# Patient Record
Sex: FEMALE | Race: WHITE | NOT HISPANIC OR LATINO | Employment: OTHER | URBAN - METROPOLITAN AREA
[De-identification: names, ages, dates, MRNs, and addresses within clinical notes are randomized per-mention and may not be internally consistent; named-entity substitution may affect disease eponyms.]

---

## 2018-03-22 ENCOUNTER — APPOINTMENT (OUTPATIENT)
Dept: RADIOLOGY | Facility: MEDICAL CENTER | Age: 83
DRG: 087 | End: 2018-03-22
Attending: HOSPITALIST
Payer: MEDICARE

## 2018-03-22 ENCOUNTER — APPOINTMENT (OUTPATIENT)
Dept: RADIOLOGY | Facility: MEDICAL CENTER | Age: 83
DRG: 087 | End: 2018-03-22
Attending: EMERGENCY MEDICINE
Payer: MEDICARE

## 2018-03-22 ENCOUNTER — HOSPITAL ENCOUNTER (OUTPATIENT)
Dept: RADIOLOGY | Facility: MEDICAL CENTER | Age: 83
End: 2018-03-22

## 2018-03-22 ENCOUNTER — RESOLUTE PROFESSIONAL BILLING HOSPITAL PROF FEE (OUTPATIENT)
Dept: HOSPITALIST | Facility: MEDICAL CENTER | Age: 83
End: 2018-03-22
Payer: MEDICARE

## 2018-03-22 ENCOUNTER — HOSPITAL ENCOUNTER (INPATIENT)
Facility: MEDICAL CENTER | Age: 83
LOS: 1 days | DRG: 087 | End: 2018-03-23
Attending: HOSPITALIST | Admitting: HOSPITALIST
Payer: MEDICARE

## 2018-03-22 DIAGNOSIS — I60.9 SUBARACHNOID HEMORRHAGE (HCC): ICD-10-CM

## 2018-03-22 DIAGNOSIS — S00.83XA CONTUSION OF FACE, INITIAL ENCOUNTER: ICD-10-CM

## 2018-03-22 DIAGNOSIS — I10 HYPERTENSION, UNSPECIFIED TYPE: ICD-10-CM

## 2018-03-22 PROBLEM — Z86.39 HISTORY OF HYPOTHYROIDISM: Status: ACTIVE | Noted: 2018-03-22

## 2018-03-22 LAB
ABO GROUP BLD: NORMAL
ABO GROUP BLD: NORMAL
ALBUMIN SERPL BCP-MCNC: 4.3 G/DL (ref 3.2–4.9)
ALBUMIN/GLOB SERPL: 1.5 G/DL
ALP SERPL-CCNC: 129 U/L (ref 30–99)
ALT SERPL-CCNC: 21 U/L (ref 2–50)
ANION GAP SERPL CALC-SCNC: 8 MMOL/L (ref 0–11.9)
APTT PPP: 26.3 SEC (ref 24.7–36)
AST SERPL-CCNC: 18 U/L (ref 12–45)
BILIRUB SERPL-MCNC: 0.6 MG/DL (ref 0.1–1.5)
BLD GP AB SCN SERPL QL: NORMAL
BUN SERPL-MCNC: 13 MG/DL (ref 8–22)
CALCIUM SERPL-MCNC: 9.8 MG/DL (ref 8.5–10.5)
CFT BLD TEG: 4.2 MIN (ref 5–10)
CHLORIDE SERPL-SCNC: 108 MMOL/L (ref 96–112)
CLOT ANGLE BLD TEG: 70.3 DEGREES (ref 53–72)
CLOT LYSIS 30M P MA LENFR BLD TEG: 0.2 % (ref 0–8)
CO2 SERPL-SCNC: 25 MMOL/L (ref 20–33)
CREAT SERPL-MCNC: 0.71 MG/DL (ref 0.5–1.4)
CT.EXTRINSIC BLD ROTEM: 1.4 MIN (ref 1–3)
ERYTHROCYTE [DISTWIDTH] IN BLOOD BY AUTOMATED COUNT: 45.1 FL (ref 35.9–50)
ETHANOL BLD-MCNC: 0 G/DL
GLOBULIN SER CALC-MCNC: 2.8 G/DL (ref 1.9–3.5)
GLUCOSE SERPL-MCNC: 106 MG/DL (ref 65–99)
HCG SERPL QL: NEGATIVE
HCT VFR BLD AUTO: 44.8 % (ref 37–47)
HGB BLD-MCNC: 14.9 G/DL (ref 12–16)
INR PPP: 0.96 (ref 0.87–1.13)
MCF BLD TEG: 66.7 MM (ref 50–70)
MCH RBC QN AUTO: 30.9 PG (ref 27–33)
MCHC RBC AUTO-ENTMCNC: 33.3 G/DL (ref 33.6–35)
MCV RBC AUTO: 92.9 FL (ref 81.4–97.8)
PA AA BLD-ACNC: 50.4 %
PA ADP BLD-ACNC: 26.9 %
PLATELET # BLD AUTO: 241 K/UL (ref 164–446)
PMV BLD AUTO: 9.7 FL (ref 9–12.9)
POTASSIUM SERPL-SCNC: 3.9 MMOL/L (ref 3.6–5.5)
PROT SERPL-MCNC: 7.1 G/DL (ref 6–8.2)
PROTHROMBIN TIME: 12.5 SEC (ref 12–14.6)
RBC # BLD AUTO: 4.82 M/UL (ref 4.2–5.4)
RH BLD: NORMAL
RH BLD: NORMAL
SODIUM SERPL-SCNC: 141 MMOL/L (ref 135–145)
TEG ALGORITHM TGALG: ABNORMAL
WBC # BLD AUTO: 8.9 K/UL (ref 4.8–10.8)

## 2018-03-22 PROCEDURE — 700102 HCHG RX REV CODE 250 W/ 637 OVERRIDE(OP): Performed by: HOSPITALIST

## 2018-03-22 PROCEDURE — 84703 CHORIONIC GONADOTROPIN ASSAY: CPT

## 2018-03-22 PROCEDURE — A9270 NON-COVERED ITEM OR SERVICE: HCPCS | Performed by: HOSPITALIST

## 2018-03-22 PROCEDURE — 85384 FIBRINOGEN ACTIVITY: CPT

## 2018-03-22 PROCEDURE — 85730 THROMBOPLASTIN TIME PARTIAL: CPT

## 2018-03-22 PROCEDURE — 99291 CRITICAL CARE FIRST HOUR: CPT

## 2018-03-22 PROCEDURE — 86901 BLOOD TYPING SEROLOGIC RH(D): CPT

## 2018-03-22 PROCEDURE — 85347 COAGULATION TIME ACTIVATED: CPT

## 2018-03-22 PROCEDURE — 85027 COMPLETE CBC AUTOMATED: CPT

## 2018-03-22 PROCEDURE — 85576 BLOOD PLATELET AGGREGATION: CPT | Mod: 91

## 2018-03-22 PROCEDURE — 80307 DRUG TEST PRSMV CHEM ANLYZR: CPT

## 2018-03-22 PROCEDURE — 93005 ELECTROCARDIOGRAM TRACING: CPT | Performed by: EMERGENCY MEDICINE

## 2018-03-22 PROCEDURE — 86850 RBC ANTIBODY SCREEN: CPT

## 2018-03-22 PROCEDURE — 770022 HCHG ROOM/CARE - ICU (200)

## 2018-03-22 PROCEDURE — 80053 COMPREHEN METABOLIC PANEL: CPT

## 2018-03-22 PROCEDURE — 86900 BLOOD TYPING SEROLOGIC ABO: CPT

## 2018-03-22 PROCEDURE — A9270 NON-COVERED ITEM OR SERVICE: HCPCS | Performed by: SURGERY

## 2018-03-22 PROCEDURE — 700102 HCHG RX REV CODE 250 W/ 637 OVERRIDE(OP): Performed by: SURGERY

## 2018-03-22 PROCEDURE — 70450 CT HEAD/BRAIN W/O DYE: CPT

## 2018-03-22 PROCEDURE — G0390 TRAUMA RESPONS W/HOSP CRITI: HCPCS

## 2018-03-22 PROCEDURE — 700111 HCHG RX REV CODE 636 W/ 250 OVERRIDE (IP): Performed by: HOSPITALIST

## 2018-03-22 PROCEDURE — 99223 1ST HOSP IP/OBS HIGH 75: CPT | Mod: AI | Performed by: HOSPITALIST

## 2018-03-22 PROCEDURE — 71045 X-RAY EXAM CHEST 1 VIEW: CPT

## 2018-03-22 PROCEDURE — 700101 HCHG RX REV CODE 250: Performed by: HOSPITALIST

## 2018-03-22 PROCEDURE — 85610 PROTHROMBIN TIME: CPT

## 2018-03-22 RX ORDER — M-VIT,TX,IRON,MINS/CALC/FOLIC 27MG-0.4MG
1 TABLET ORAL DAILY
COMMUNITY

## 2018-03-22 RX ORDER — SODIUM CHLORIDE 9 MG/ML
INJECTION, SOLUTION INTRAVENOUS
Status: DISCONTINUED | OUTPATIENT
Start: 2018-03-22 | End: 2018-03-22

## 2018-03-22 RX ORDER — OMEPRAZOLE 20 MG/1
20 CAPSULE, DELAYED RELEASE ORAL DAILY
COMMUNITY

## 2018-03-22 RX ORDER — ACETAMINOPHEN 325 MG/1
650 TABLET ORAL EVERY 4 HOURS PRN
Status: DISCONTINUED | OUTPATIENT
Start: 2018-03-22 | End: 2018-03-23 | Stop reason: HOSPADM

## 2018-03-22 RX ORDER — ONDANSETRON 4 MG/1
4 TABLET, ORALLY DISINTEGRATING ORAL EVERY 4 HOURS PRN
Status: DISCONTINUED | OUTPATIENT
Start: 2018-03-22 | End: 2018-03-23 | Stop reason: HOSPADM

## 2018-03-22 RX ORDER — SIMVASTATIN 10 MG
10 TABLET ORAL NIGHTLY
COMMUNITY

## 2018-03-22 RX ORDER — AMOXICILLIN 250 MG
2 CAPSULE ORAL 2 TIMES DAILY
Status: DISCONTINUED | OUTPATIENT
Start: 2018-03-22 | End: 2018-03-23 | Stop reason: HOSPADM

## 2018-03-22 RX ORDER — LEVOTHYROXINE SODIUM 0.03 MG/1
25 TABLET ORAL
COMMUNITY

## 2018-03-22 RX ORDER — HYDRALAZINE HYDROCHLORIDE 20 MG/ML
20 INJECTION INTRAMUSCULAR; INTRAVENOUS EVERY 6 HOURS PRN
Status: DISCONTINUED | OUTPATIENT
Start: 2018-03-22 | End: 2018-03-23 | Stop reason: HOSPADM

## 2018-03-22 RX ORDER — ASPIRIN 81 MG/1
81 TABLET, CHEWABLE ORAL DAILY
Status: ON HOLD | COMMUNITY
End: 2018-03-23

## 2018-03-22 RX ORDER — LABETALOL HYDROCHLORIDE 5 MG/ML
10 INJECTION, SOLUTION INTRAVENOUS
Status: DISCONTINUED | OUTPATIENT
Start: 2018-03-22 | End: 2018-03-22

## 2018-03-22 RX ORDER — LABETALOL HYDROCHLORIDE 5 MG/ML
10 INJECTION, SOLUTION INTRAVENOUS EVERY 4 HOURS PRN
Status: DISCONTINUED | OUTPATIENT
Start: 2018-03-22 | End: 2018-03-23 | Stop reason: HOSPADM

## 2018-03-22 RX ORDER — POLYETHYLENE GLYCOL 3350 17 G/17G
1 POWDER, FOR SOLUTION ORAL
Status: DISCONTINUED | OUTPATIENT
Start: 2018-03-22 | End: 2018-03-23 | Stop reason: HOSPADM

## 2018-03-22 RX ORDER — HYDRALAZINE HYDROCHLORIDE 20 MG/ML
20 INJECTION INTRAMUSCULAR; INTRAVENOUS EVERY 4 HOURS PRN
Status: DISCONTINUED | OUTPATIENT
Start: 2018-03-22 | End: 2018-03-22

## 2018-03-22 RX ORDER — ONDANSETRON 2 MG/ML
4 INJECTION INTRAMUSCULAR; INTRAVENOUS EVERY 4 HOURS PRN
Status: DISCONTINUED | OUTPATIENT
Start: 2018-03-22 | End: 2018-03-23 | Stop reason: HOSPADM

## 2018-03-22 RX ORDER — ACETAMINOPHEN 325 MG/1
650 TABLET ORAL EVERY 6 HOURS PRN
Status: DISCONTINUED | OUTPATIENT
Start: 2018-03-22 | End: 2018-03-22

## 2018-03-22 RX ORDER — BISACODYL 10 MG
10 SUPPOSITORY, RECTAL RECTAL
Status: DISCONTINUED | OUTPATIENT
Start: 2018-03-22 | End: 2018-03-23 | Stop reason: HOSPADM

## 2018-03-22 RX ORDER — OMEPRAZOLE 20 MG/1
20 CAPSULE, DELAYED RELEASE ORAL DAILY
Status: DISCONTINUED | OUTPATIENT
Start: 2018-03-22 | End: 2018-03-23 | Stop reason: HOSPADM

## 2018-03-22 RX ORDER — LEVETIRACETAM 500 MG/1
500 TABLET ORAL 2 TIMES DAILY
Status: DISCONTINUED | OUTPATIENT
Start: 2018-03-22 | End: 2018-03-23 | Stop reason: HOSPADM

## 2018-03-22 RX ADMIN — ACETAMINOPHEN 650 MG: 325 TABLET, FILM COATED ORAL at 18:58

## 2018-03-22 RX ADMIN — OMEPRAZOLE 20 MG: 20 CAPSULE, DELAYED RELEASE ORAL at 15:13

## 2018-03-22 RX ADMIN — HYDRALAZINE HYDROCHLORIDE 20 MG: 20 INJECTION INTRAMUSCULAR; INTRAVENOUS at 16:10

## 2018-03-22 RX ADMIN — LABETALOL HYDROCHLORIDE 10 MG: 5 INJECTION, SOLUTION INTRAVENOUS at 15:09

## 2018-03-22 RX ADMIN — LEVETIRACETAM 500 MG: 500 TABLET, FILM COATED ORAL at 21:29

## 2018-03-22 RX ADMIN — STANDARDIZED SENNA CONCENTRATE AND DOCUSATE SODIUM 2 TABLET: 8.6; 5 TABLET, FILM COATED ORAL at 21:29

## 2018-03-22 ASSESSMENT — LIFESTYLE VARIABLES
EVER_SMOKED: NEVER
ALCOHOL_USE: YES
TOTAL SCORE: 0
EVER FELT BAD OR GUILTY ABOUT YOUR DRINKING: NO
TOTAL SCORE: 0
CONSUMPTION TOTAL: INCOMPLETE
TOTAL SCORE: 0
HAVE YOU EVER FELT YOU SHOULD CUT DOWN ON YOUR DRINKING: NO
HAVE PEOPLE ANNOYED YOU BY CRITICIZING YOUR DRINKING: NO
EVER HAD A DRINK FIRST THING IN THE MORNING TO STEADY YOUR NERVES TO GET RID OF A HANGOVER: NO

## 2018-03-22 ASSESSMENT — PATIENT HEALTH QUESTIONNAIRE - PHQ9
SUM OF ALL RESPONSES TO PHQ9 QUESTIONS 1 AND 2: 0
SUM OF ALL RESPONSES TO PHQ9 QUESTIONS 1 AND 2: 0
1. LITTLE INTEREST OR PLEASURE IN DOING THINGS: NOT AT ALL
1. LITTLE INTEREST OR PLEASURE IN DOING THINGS: NOT AT ALL
2. FEELING DOWN, DEPRESSED, IRRITABLE, OR HOPELESS: NOT AT ALL
2. FEELING DOWN, DEPRESSED, IRRITABLE, OR HOPELESS: NOT AT ALL

## 2018-03-22 ASSESSMENT — PAIN SCALES - GENERAL
PAINLEVEL_OUTOF10: 1
PAINLEVEL_OUTOF10: 4
PAINLEVEL_OUTOF10: 3
PAINLEVEL_OUTOF10: 1
PAINLEVEL_OUTOF10: 6
PAINLEVEL_OUTOF10: 1
PAINLEVEL_OUTOF10: 4

## 2018-03-22 ASSESSMENT — ENCOUNTER SYMPTOMS
PALPITATIONS: 0
SEIZURES: 0
CHILLS: 0
DIZZINESS: 0
SENSORY CHANGE: 0
FOCAL WEAKNESS: 0
SPUTUM PRODUCTION: 0
HEMOPTYSIS: 0
VOMITING: 0
ORTHOPNEA: 0
LOSS OF CONSCIOUSNESS: 0
NAUSEA: 0
COUGH: 0
SPEECH CHANGE: 0
HEADACHES: 1

## 2018-03-22 NOTE — H&P
"TRAUMA HISTORY AND PHYSICAL    DATE OF SERVICE: 3/22/2018    ACTIVATION LEVEL: GREEN Transfer.     HISTORY OF PRESENT ILLNESS: The patient is a 86 year old female who sustained a ground level fall yesterday. She tripped on a curb and struck her left face and head.  She did not lose consciousness.  She was initially evaluated at [facility] and found to have a tiny left sub-arachnoid hemorrhage.  The patient was triaged as a GREEN Transfer in accordance with established pre hospital protocols. An expeditious primary and secondary survey with required adjuncts was conducted. See Trauma Narrator for full details.    Upon my arrival to bedside, the patient is awake, cooperative, and able to provide history.  The left side of her face aches.  Vision in the left eye is intact.    PAST MEDICAL HISTORY: GERD, HLD, Hypothyroidism      PAST SURGICAL HISTORY: Fundoplication, \"pelvic recontruction\"     ALLERGIES: Patient has no known allergies.      CURRENT MEDICATIONS:   Outpatient Prescriptions Marked as Taking for the 3/22/18 encounter (Hospital Encounter)   Medication Sig   • omeprazole (PRILOSEC) 20 MG delayed-release capsule Take 20 mg by mouth every day.   • LEVOTHYROXINE SODIUM PO Take  by mouth.   • aspirin (ASA) 81 MG Chew Tab chewable tablet Take 81 mg by mouth every day.   • SIMVASTATIN PO Take  by mouth.   • therapeutic multivitamin-minerals (THERAGRAN-M) Tab Take 1 Tab by mouth every day.   • NON SPECIFIED Take 1 Tab by mouth every day. Vitamin C with E 400   • Cholecalciferol (VITAMIN D PO) Take 1 Cap by mouth every day.   • CRANBERRY PO Take 1 Cap by mouth every day.   • Calcium Carbonate-Vit D-Min (CALCIUM 1200 PO) Take 1 Tab by mouth every day.         FAMILY HISTORY:  Reviewed and found to be non-contributory in regards to the above presentation    SOCIAL HISTORY:  reports that she has never smoked. She has never used smokeless tobacco. She reports that she does not drink alcohol or use drugs.      REVIEW OF " SYSTEMS:   Review of Systems:  Constitutional: Negative for fever, chills, weight loss, malaise/fatigue and diaphoresis.   HENT: Negative for hearing loss, ear pain, nosebleeds, congestion, sore throat, neck pain, tinnitus and ear discharge.    Eyes: Negative for blurred vision, double vision, photophobia, pain, discharge and redness.   Respiratory: Negative for cough, hemoptysis, sputum production, shortness of breath, wheezing and stridor.    Cardiovascular: Negative for chest pain, palpitations, orthopnea, claudication, leg swelling and PND.   Gastrointestinal: Negative for heartburn, nausea, vomiting, abdominal pain, diarrhea, constipation, blood in stool and melena.   Genitourinary: Negative for dysuria, urgency, frequency, hematuria and flank pain.   Musculoskeletal: Negative for myalgias, back pain, joint pain and falls.   Skin: Negative for itching and rash.  Neurological: Negative for dizziness, tingling, tremors, sensory change, speech change, focal weakness, seizures, loss of consciousness, weakness and headaches.   Endo/Heme/Allergies: Negative for environmental allergies and polydipsia. Does not bruise/bleed easily.   Psychiatric/Behavioral: Negative for depression, suicidal ideas, hallucinations, memory loss and substance abuse. The patient is not nervous/anxious and does not have insomnia.    PHYSICAL EXAMINATION:     GENERAL:  Otherwise healthy-appearing and in no acute distress    HEENT:    · HEAD: Atraumatic, normocephalic.    · EARS: Normal pinna bilaterally.  External auditory canals are without discharge. No hemotympanum.   · EYES: Conjunctivae and sclerae are clear. Extraocular movements are full. Pupils are equal, round, and reactive to light. No ellen-orbital hematoma.   · NOSE: No rhinorrhea  · THROAT: Oral mucosa is moist.    FACE: The midface and jaw are stable. No malocclusion of bite is evident on visual inspection.  There is mild swelling and ecchymosis of the left side of the  face.    NECK:  Soft and supple without lymphadenopathy. No masses are noted.  Trachea is midline. There is no cervical crepitance. Palpation of the posterior bony spine demonstrates no midline tenderness.     CHEST:  Lungs are clear to auscultation bilaterally. Symmetrical rise with respiration.  No chest wall tenderness or instability.  No crepitance.  No wounds, lacerations, or excoriations.    CARDIOVASCULAR:  Regular rate and rhythm.  No jugulo-venous distention.  Palpable pulses present in all four extremities.      ABDOMEN:  Soft, non-tender, non-distended.  Non-tympanitic.  No wounds, lacerations, or excoriations.    BACK/PELVIS:    · Thoracic Vertebrae - non tender with palpation, no stepoffs.  · Lumbar Vertebrae - non tender with palpation, no stepoffs.  · Sacrum - non tender with palpation  · Pelvic Wings - non tender with palpation    RECTAL:  Deferred    GENITOURINARY:  The patient has normal external reproductive anatomy.    EXTREMITIES:  · RIGHT ARM: Without deformities, wounds, lacerations, or excoriations.  Full passive and active range of motion without pain.  · LEFT ARM: Without deformities, wounds, lacerations, or excoriations.  Full passive and active range of motion without pain.  · RIGHT LEG: Without deformities, wounds, lacerations, or excoriations.  Full passive and active range of motion without pain.  · LEFT LEG: Without deformities, wounds, lacerations, or excoriations.  Full passive and active range of motion without pain.    NEUROLOGIC:  Joselito Coma Score 15. Cranial nerves II through XII are grossly intact. Motor and sensory exams are normal in all four extremities. Motor and sensory reflexes are 2+ and symmetric with bilateral plantar responses.    PSYCHIATRIC: Affect and mood is appropriate for age and condition.    LABORATORY VALUES:   Recent Labs      03/22/18   1144   WBC  8.9   RBC  4.82   HEMOGLOBIN  14.9   HEMATOCRIT  44.8   MCV  92.9   MCH  30.9   MCHC  33.3*   RDW  45.1    PLATELETCT  241   MPV  9.7     Recent Labs      03/22/18   1144   SODIUM  141   POTASSIUM  3.9   CHLORIDE  108   CO2  25   GLUCOSE  106*   BUN  13   CREATININE  0.71   CALCIUM  9.8     Recent Labs      03/22/18   1144   ASTSGOT  18   ALTSGPT  21   TBILIRUBIN  0.6   ALKPHOSPHAT  129*   GLOBULIN  2.8   INR  0.96     Recent Labs      03/22/18   1144   APTT  26.3   INR  0.96        IMAGING:   CT-HEAD W/O   Final Result      1.  Cerebral atrophy.      2.  White matter lucencies most consistent with small vessel ischemic change versus demyelination or gliosis.      3. Small left cerebral subarachnoid hemorrhage. No new hemorrhage. No significant mass effect.      DX-CHEST-LIMITED (1 VIEW)   Final Result      No pneumothorax or pleural effusion.      OUTSIDE IMAGES-CT HEAD   Final Result      OUTSIDE IMAGES-CT FACE   Final Result        Images from [facility] were reviewed personally.    IMPRESSION AND PLAN:     Active Hospital Problems    Diagnosis   • SAH (subarachnoid hemorrhage) (CMS-HCC) [I60.9]     Priority: High     [facility]: Small left cerebral subarachnoid hemorrhage  Follow up CT on arrival: No change.  Keppra x7 days  Non-operative management.  Zack Oliveira III, MD. Neurosurgery.       • Accelerated hypertension [I10]     Priority: Low     PRN IV anti-hypertensives ordered     • History of hypothyroidism [Z86.39]     Priority: Low     Restart home levothyroxine when dose verified       I was not consulted by the ER MD regarding this patient.  She was initially admitted to the ICU on the medical service.  Per pre-established trauma protocol: traumatic brain injury, admitted to the ICU, mandates trauma service management.  The hospitalist service can be re-consulted upon transfer from the ICU.  Discussed with Dr. Payton - initial admitting MD - who is in agreement.    DISPOSITION:  ICU.    Aggregated care time spent evaluating, reviewing documentation, providing care, and managing this patient exclusive of procedures: 35  minutes  ____________________________________   Jair CASTRO / SHEREEN     DD: 3/22/2018   DT: 2:40 PM

## 2018-03-22 NOTE — ED PROVIDER NOTES
ED Provider Note    Scribed for No att. providers found by Jaxson Guzmán. 3/22/2018, 11:37 AM.    Primary care provider: No primary care provider on file.  Means of arrival: EMS  History obtained from: patient and EMS  History limited by: none    CHIEF COMPLAINT  Chief Complaint   Patient presents with   • Other     subarachnoid bleed       HPI  Noelle DolanYield Claude-Three) is a 86 y.o. female who presents to the Emergency Department as a trauma green transfer from [facility] for evaluation of subarachnoid hemorrhage and complaining of a mild headache status post mechanical ground level fall sustained yesterday. Patient describes her pain as 1/10 severity. She reports associated facial ecchymosis. Patient reports that she was walking outside when she tripped over a curb and fell, impacting her left face on the concrete. She states that her pain worsened since yesterday, so she presented to the ED in [place] for evaluation of possible cheekbone fracture. Patient was found to have a subarachnoid hemorrhage and transferred to [facility] for neurology consult. She reports daily aspirin use. Patient denies loss of consciousness, weakness, nausea, vomiting, visual changes, coumadin use.     REVIEW OF SYSTEMS  Pertinent positives include headache, ecchymosis, ground level fall, impact to head. Pertinent negatives include no loss of consciousness, weakness, nausea, vomiting, visual changes. As above, all other systems reviewed and are negative.   See HPI for further details.   C.    PAST MEDICAL HISTORY   high cholesterol, hypothyroidism, hypertension    SURGICAL HISTORY  patient denies any surgical history    SOCIAL HISTORY  Social History   Substance Use Topics   • Smoking status: No   • Smokeless tobacco: No   • Alcohol use No      History   Drug use: No       FAMILY HISTORY  None noted    CURRENT MEDICATIONS  Current Outpatient Prescriptions:   •  omeprazole (PRILOSEC) 20 MG delayed-release capsule, Take 20 mg by mouth  every day., Disp: , Rfl:   •  LEVOTHYROXINE SODIUM PO, Take  by mouth., Disp: , Rfl:   •  aspirin (ASA) 81 MG Chew Tab chewable tablet, Take 81 mg by mouth every day., Disp: , Rfl:   •  SIMVASTATIN PO, Take  by mouth., Disp: , Rfl:   •  multivitamin (THERAGRAN) Tab, Take 1 Tab by mouth every day., Disp: , Rfl:     ALLERGIES  No Known Allergies    PHYSICAL EXAM  VITAL SIGNS: BP (!) 209/93   Pulse 65   Temp 36.9 °C (98.4 °F)   Resp 18   Ht 1.524 m (5')   Wt 61.7 kg (136 lb)   BMI 26.56 kg/m²   Vitals reviewed.  Constitutional: Alert. answering questions normally.   HENT: Contusion of left cheek with non bony crepitus or step off, No malocclusion of teeth, Bilateral external ears normal, Nose normal.   Eyes: Pupils are equal and reactive, Conjunctiva normal, Non-icteric.   Neck: Normal range of motion, No tenderness, Supple, No stridor.   Lymphatic: No lymphadenopathy noted.   Cardiovascular: Regular rate and rhythm, no murmurs.   Thorax & Lungs: Normal breath sounds, No respiratory distress, No wheezing, No chest tenderness.   Abdomen: Bowel sounds normal, Soft, No tenderness, No peritoneal signs, No masses, No pulsatile masses.   Skin: Warm, Dry, No erythema, No rash. Ecchymosis to left face about the prominence of the zygomatic bone.    Back: No bony tenderness, No CVA tenderness.   Extremities: Intact distal pulses, No edema, No tenderness, No cyanosis  Musculoskeletal: Good range of motion in all major joints. No tenderness to palpation or major deformities noted.   Neurologic: Alert , Normal motor function, Normal sensory function, No focal deficits noted.   Psychiatric: Affect normal, Judgment normal, Mood normal.     DIAGNOSTIC STUDIES / PROCEDURES    LABS  Labs Reviewed   CBC WITHOUT DIFFERENTIAL - Abnormal; Notable for the following:        Result Value    MCHC 33.3 (*)     All other components within normal limits   COMP METABOLIC PANEL - Abnormal; Notable for the following:     Glucose 106 (*)      Alkaline Phosphatase 129 (*)     All other components within normal limits   PLATELET MAPPING WITH BASIC TEG - Abnormal; Notable for the following:     Reaction Time Initial-R 4.2 (*)     All other components within normal limits   DIAGNOSTIC ALCOHOL   PROTHROMBIN TIME   APTT   HCG QUAL SERUM   COD (ADULT)   COMPONENT CELLULAR   ABO AND RH CONFIRMATION   ESTIMATED GFR   All labs reviewed by me.    EKG Interpretation:  Interpreted by me    12 Lead EKG interpreted by me to show:  Normal sinus rhythm  Rate 62  Axis: LAD -24  LVH  Intervals: Normal  Normal T waves  Normal ST segments  My impression of this EKG: Does not indicate ischemia or arrhythmia at this time.    RADIOLOGY  CT-HEAD W/O   Final Result      1.  Cerebral atrophy.      2.  White matter lucencies most consistent with small vessel ischemic change versus demyelination or gliosis.      3. Small left cerebral subarachnoid hemorrhage. No new hemorrhage. No significant mass effect.      DX-CHEST-LIMITED (1 VIEW)   Final Result      No pneumothorax or pleural effusion.      OUTSIDE IMAGES-CT HEAD   Final Result      OUTSIDE IMAGES-CT FACE   Final Result      The radiologist's interpretation of all radiological studies have been reviewed by me.    COURSE & MEDICAL DECISION MAKING  Nursing notes, VS, PMSFHx reviewed in chart.  Differential diagnoses include but not limited to: subarachnoid hemorrhage     Obtained and reviewed past medical records from transferring facility which showed:  CT face was negative. CT head showed cerebral volume loss with mild periventricular white matter hypoattenuation. Grey-white matter differentiation is preserved. The ventricles and sulci are appropriate for age. There is no intracranial hemorrhage, mass, extra axial fluid collection, midline shift, or hydrocephalus. The calvarium is intact.     11:37 AM - Patient's case was routed through the transfer center. Patient seen and examined at bedside. Ordered for DX chest to  evaluate. Ordered platelet mapping.  Patient will be kept NPO. Her blood pressure is slightly improved from initial vitals: 178/84. I have ordered hemorrhagic CVA blood pressure medication protocol.     11:58 AM - Paged neurosurgeon.     12:03 PM - I discussed the patient's case and the above findings with Dr. Oliveira ([facility]) who,  based the patient's history, aspirin use, and physical exam recommends no further imaging. He does not feel that he needs to assess the patient at this time. He is available for further consult if the patient's condition deteriorates.     12:07 PM - I discussed the patient's case and the above findings with Dr. Payton (hospitalist) who agrees to admit the patient.     1:35 PM - Dr. Oliveira has called back and informs that he now wants to evaluate the patient. I informed him of the patients admission and MR number. He will consult.     DISPOSITION:  Patient will be admitted to hospital in critical condition.    CRITICAL CARE  The very real possibilty of a deterioration of this patient's condition required the highest level of my preparedness for sudden, emergent intervention.  I provided critical care services, which included medication orders, frequent reevaluations of the patient's condition and response to treatment, ordering and reviewing test results, and discussing the case with various consultants.  The critical care time associated with the care of the patient was 35 minutes. Review chart for interventions. This time is exclusive of any other billable procedures.     FINAL IMPRESSION  1. Subarachnoid hemorrhage (CMS-HCC)    2. Contusion of face, initial encounter    3. Hypertension, unspecified type       Critical care time 35 minutes as above.      Jaxson SPIVEY (Nesha), am scribing for, and in the presence of, No att. providers found.    Electronically signed by: Jaxson Guzmán (Nesha), 3/22/2018    IElio MD personally performed the services described in  this documentation, as scribed by Jaxson Guzmán in my presence, and it is both accurate and complete.    The note accurately reflects work and decisions made by me.  Krishan Singh  3/22/2018  1:41 PM

## 2018-03-22 NOTE — H&P
Hospital Medicine History and Physical    Date of Service  3/22/2018    Chief Complaint  Fall and face pain    History of Presenting Illness  86 y.o. female who presented 3/22/2018 with fall and face pain.    Unloading heavy packages from the car, she tripped on something and fell, landing on her face.  No loss of consciousness, initially not a lot of pain.  She hosted a party last night.  She awoke early this morning with severe facial pain and some left eye blurriness.  She took aspirin, dose unknown, no improvement in the pain.  She decided to visit the emergency room in [place], a CT scan showed a small subarachnoid hemorrhage and she was transferred to [facility] for specialty coverage.  Patient currently has 1-2/10 pain at her left face, no headache, blurry vision has resolved.  No lethargy, no focal weakness    Patient has been hypertensive in the Emergency Room with SBP's to 200's, she says her blood pressure is typically in the normal range.    Primary Care Physician  No primary care provider on file.    Consultants  Dr. Jm Oliveira of neurology has reviewed the imaging and discussed with emergency room physician Dr. Ridley    Code Status  Full Code    Review of Systems  Review of Systems   Constitutional: Negative for chills and malaise/fatigue.   Respiratory: Negative for cough, hemoptysis and sputum production.    Cardiovascular: Negative for chest pain, palpitations and orthopnea.   Gastrointestinal: Negative for nausea and vomiting.   Skin: Negative for itching and rash.   Neurological: Positive for headaches. Negative for dizziness, sensory change, speech change, focal weakness, seizures and loss of consciousness.   All other systems reviewed and are negative.       Past Medical History  Impaired fasting glucose    Surgical History  Remote appy in her teens    Medications  No current facility-administered medications on file prior to encounter.      No current outpatient  prescriptions on file prior to encounter.       Family History  Sister with lung cancer    Social History  Social History   Substance Use Topics   • Smoking status: Never Smoker   • Smokeless tobacco: Never Used   • Alcohol use No       Allergies  No Known Allergies     Physical Exam  Laboratory   Hemodynamics  Temp (24hrs), Av.9 °C (98.4 °F), Min:36.9 °C (98.4 °F), Max:36.9 °C (98.4 °F)   Temperature: 36.9 °C (98.4 °F)  Pulse  Av.3  Min: 65  Max: 66    Blood Pressure : 153/79      Respiratory      Respiration: 16             Physical Exam   Constitutional: She is oriented to person, place, and time. She appears well-developed and well-nourished.   HENT:   Head: Normocephalic.   Right Ear: External ear normal.   Left Ear: External ear normal.   Ecchymosis at left face   Eyes: Conjunctivae and EOM are normal. Right eye exhibits no discharge. Left eye exhibits no discharge. No scleral icterus.   Cardiovascular: Normal rate, regular rhythm and intact distal pulses.  Exam reveals no friction rub.    No murmur heard.  Pulmonary/Chest: Effort normal and breath sounds normal. No respiratory distress. She has no wheezes.   Abdominal: Soft. Bowel sounds are normal. She exhibits no distension. There is no tenderness. There is no rebound.   Musculoskeletal: Normal range of motion. She exhibits deformity. She exhibits no edema.   Neurological: She is alert and oriented to person, place, and time. No cranial nerve deficit.   Skin: Skin is warm and dry. No rash noted. She is not diaphoretic. No erythema.   Psychiatric: She has a normal mood and affect. Her behavior is normal.       Recent Labs      18   1144   WBC  8.9   RBC  4.82   HEMOGLOBIN  14.9   HEMATOCRIT  44.8   MCV  92.9   MCH  30.9   MCHC  33.3*   RDW  45.1   PLATELETCT  241   MPV  9.7     Recent Labs      18   1144   SODIUM  141   POTASSIUM  3.9   CHLORIDE  108   CO2  25   GLUCOSE  106*   BUN  13   CREATININE  0.71   CALCIUM  9.8     Recent Labs       03/22/18   1144   ALTSGPT  21   ASTSGOT  18   ALKPHOSPHAT  129*   TBILIRUBIN  0.6   GLUCOSE  106*     Recent Labs      03/22/18   1144   APTT  26.3   INR  0.96             No results found for: TROPONINI  Urinalysis:  No results found for: SPECGRAVITY, GLUCOSEUR, KETONES, NITRITE, WBCURINE, RBCURINE, BACTERIA, EPITHELCELL     Imaging  CT scan from [facility]: small subarachnoid bleed   Assessment/Plan     I anticipate this patient will require at least two midnights for appropriate medical management, necessitating inpatient admission.    SAH (subarachnoid hemorrhage) (CMS-HCC)- (present on admission)   Assessment & Plan    Traumatic SDH  Admit to ICU  Repeat CT shows no progression  Patient will be admitted to ICU, I spoke with Dr. Gonzalez, trauma service will assume care.  Hospitalist service available if patient ends up being transferred to the floor        Accelerated hypertension- (present on admission)   Assessment & Plan    Hypertensive urgency with SBP greater than 200  No history of essential HTN  For now will use IV labetalol and or hydralazine with goal   Continue hemodynamic monitoring with IV blood pressure medicaitons            VTE prophylaxis: SCD's, no chemoprophylaxis.

## 2018-03-22 NOTE — ASSESSMENT & PLAN NOTE
Traumatic SDH  Admit to ICU  Repeat CT shows no progression  Patient will be admitted to ICU, I spoke with Dr. Gonzalez, trauma service will assume care.  Hospitalist service available if patient ends up being transferred to the floor

## 2018-03-22 NOTE — PROGRESS NOTES
Med rec partially complete per pt and list.   I will contact [facility] for strengths of Levothyroxine and Simvastatin  Allergies reviewed .

## 2018-03-22 NOTE — ED TRIAGE NOTES
[facility], patient tripped over a parking curb last night, unable to break fall, struck L side of face, no LOC, this morning patient went to [facility] ER concerned she may have 'broken my cheekbone', found to have a SAH, c/o ha rates pain at 1, denies nausea, visual changes/disturbances, or dizziness, patient has bruising L side of face, no deficits noted per Dr Ding

## 2018-03-22 NOTE — ASSESSMENT & PLAN NOTE
Hypertensive urgency with SBP greater than 200  No history of essential HTN  For now will use IV labetalol and or hydralazine with goal   Continue hemodynamic monitoring with IV blood pressure medicaitons

## 2018-03-23 VITALS
WEIGHT: 138.89 LBS | HEART RATE: 70 BPM | OXYGEN SATURATION: 95 % | DIASTOLIC BLOOD PRESSURE: 75 MMHG | TEMPERATURE: 96.6 F | HEIGHT: 60 IN | SYSTOLIC BLOOD PRESSURE: 145 MMHG | RESPIRATION RATE: 28 BRPM | BODY MASS INDEX: 27.27 KG/M2

## 2018-03-23 PROBLEM — S00.83XA FACIAL CONTUSION: Status: ACTIVE | Noted: 2018-03-23

## 2018-03-23 LAB
ANION GAP SERPL CALC-SCNC: 7 MMOL/L (ref 0–11.9)
BASOPHILS # BLD AUTO: 0.4 % (ref 0–1.8)
BASOPHILS # BLD: 0.03 K/UL (ref 0–0.12)
BUN SERPL-MCNC: 18 MG/DL (ref 8–22)
CALCIUM SERPL-MCNC: 9.2 MG/DL (ref 8.5–10.5)
CHLORIDE SERPL-SCNC: 109 MMOL/L (ref 96–112)
CO2 SERPL-SCNC: 22 MMOL/L (ref 20–33)
CREAT SERPL-MCNC: 0.81 MG/DL (ref 0.5–1.4)
EOSINOPHIL # BLD AUTO: 0.2 K/UL (ref 0–0.51)
EOSINOPHIL NFR BLD: 3 % (ref 0–6.9)
ERYTHROCYTE [DISTWIDTH] IN BLOOD BY AUTOMATED COUNT: 44.6 FL (ref 35.9–50)
GLUCOSE SERPL-MCNC: 122 MG/DL (ref 65–99)
HCT VFR BLD AUTO: 40.4 % (ref 37–47)
HGB BLD-MCNC: 13.9 G/DL (ref 12–16)
IMM GRANULOCYTES # BLD AUTO: 0.03 K/UL (ref 0–0.11)
IMM GRANULOCYTES NFR BLD AUTO: 0.4 % (ref 0–0.9)
LYMPHOCYTES # BLD AUTO: 2.64 K/UL (ref 1–4.8)
LYMPHOCYTES NFR BLD: 39.5 % (ref 22–41)
MCH RBC QN AUTO: 31.5 PG (ref 27–33)
MCHC RBC AUTO-ENTMCNC: 34.4 G/DL (ref 33.6–35)
MCV RBC AUTO: 91.6 FL (ref 81.4–97.8)
MONOCYTES # BLD AUTO: 0.87 K/UL (ref 0–0.85)
MONOCYTES NFR BLD AUTO: 13 % (ref 0–13.4)
NEUTROPHILS # BLD AUTO: 2.92 K/UL (ref 2–7.15)
NEUTROPHILS NFR BLD: 43.7 % (ref 44–72)
NRBC # BLD AUTO: 0 K/UL
NRBC BLD-RTO: 0 /100 WBC
PLATELET # BLD AUTO: 225 K/UL (ref 164–446)
PMV BLD AUTO: 9.9 FL (ref 9–12.9)
POTASSIUM SERPL-SCNC: 4 MMOL/L (ref 3.6–5.5)
RBC # BLD AUTO: 4.41 M/UL (ref 4.2–5.4)
SODIUM SERPL-SCNC: 138 MMOL/L (ref 135–145)
WBC # BLD AUTO: 6.7 K/UL (ref 4.8–10.8)

## 2018-03-23 PROCEDURE — 85025 COMPLETE CBC W/AUTO DIFF WBC: CPT

## 2018-03-23 PROCEDURE — A9270 NON-COVERED ITEM OR SERVICE: HCPCS | Performed by: SURGERY

## 2018-03-23 PROCEDURE — G8987 SELF CARE CURRENT STATUS: HCPCS | Mod: CI

## 2018-03-23 PROCEDURE — 99233 SBSQ HOSP IP/OBS HIGH 50: CPT | Performed by: SURGERY

## 2018-03-23 PROCEDURE — 80048 BASIC METABOLIC PNL TOTAL CA: CPT

## 2018-03-23 PROCEDURE — 97165 OT EVAL LOW COMPLEX 30 MIN: CPT

## 2018-03-23 PROCEDURE — 700102 HCHG RX REV CODE 250 W/ 637 OVERRIDE(OP): Performed by: SURGERY

## 2018-03-23 PROCEDURE — G8988 SELF CARE GOAL STATUS: HCPCS | Mod: CI

## 2018-03-23 PROCEDURE — G8978 MOBILITY CURRENT STATUS: HCPCS | Mod: CH

## 2018-03-23 PROCEDURE — G8989 SELF CARE D/C STATUS: HCPCS | Mod: CI

## 2018-03-23 PROCEDURE — 97161 PT EVAL LOW COMPLEX 20 MIN: CPT

## 2018-03-23 PROCEDURE — G8979 MOBILITY GOAL STATUS: HCPCS | Mod: CH

## 2018-03-23 PROCEDURE — G8980 MOBILITY D/C STATUS: HCPCS | Mod: CH

## 2018-03-23 RX ORDER — LEVETIRACETAM 500 MG/1
500 TABLET ORAL 2 TIMES DAILY
Qty: 14 TAB | Refills: 0 | Status: SHIPPED | OUTPATIENT
Start: 2018-03-23 | End: 2018-03-30

## 2018-03-23 RX ORDER — ACETAMINOPHEN 325 MG/1
650 TABLET ORAL EVERY 4 HOURS PRN
Qty: 30 TAB | Refills: 0 | COMMUNITY
Start: 2018-03-23

## 2018-03-23 RX ADMIN — LEVETIRACETAM 500 MG: 500 TABLET, FILM COATED ORAL at 08:13

## 2018-03-23 RX ADMIN — OMEPRAZOLE 20 MG: 20 CAPSULE, DELAYED RELEASE ORAL at 08:13

## 2018-03-23 ASSESSMENT — COGNITIVE AND FUNCTIONAL STATUS - GENERAL
SUGGESTED CMS G CODE MODIFIER MOBILITY: CH
DAILY ACTIVITIY SCORE: 24
SUGGESTED CMS G CODE MODIFIER DAILY ACTIVITY: CH
MOBILITY SCORE: 24

## 2018-03-23 ASSESSMENT — ENCOUNTER SYMPTOMS
CHILLS: 0
FEVER: 0
NAUSEA: 0
SHORTNESS OF BREATH: 0
DIZZINESS: 0
FALLS: 1
DIARRHEA: 0
HEADACHES: 1
VOMITING: 0
ABDOMINAL PAIN: 0

## 2018-03-23 ASSESSMENT — GAIT ASSESSMENTS
GAIT LEVEL OF ASSIST: SUPERVISED
DISTANCE (FEET): 300

## 2018-03-23 ASSESSMENT — PAIN SCALES - GENERAL
PAINLEVEL_OUTOF10: 1
PAINLEVEL_OUTOF10: 0

## 2018-03-23 ASSESSMENT — ACTIVITIES OF DAILY LIVING (ADL): TOILETING: INDEPENDENT

## 2018-03-23 ASSESSMENT — LIFESTYLE VARIABLES: EVER_SMOKED: NEVER

## 2018-03-23 NOTE — PROGRESS NOTES
Discharge instructions given. Pt has no further questions or concerns. Plan for patient to leave with Delvin Bradley, son, at 1pm via private vehicle.

## 2018-03-23 NOTE — CARE PLAN
Problem: Safety  Goal: Will remain free from injury  Outcome: PROGRESSING AS EXPECTED  Pt knows to summon RN before attempting to get OOB. Bed is low and locked. Pathways free of clutter.

## 2018-03-23 NOTE — PROGRESS NOTES
Pt sent home with pt son. Pt states that she would like to walk down to the car and insists that she does not need a wheelchair.

## 2018-03-23 NOTE — DISCHARGE INSTRUCTIONS
Discharge Instructions    Discharged to home by car with relative. Discharged via walking, hospital escort: Yes.   Special equipment needed: Not Applicable    Be sure to schedule a follow-up appointment with your primary care doctor or any specialists as instructed.     Discharge Plan:   Diet Plan: Discussed  Activity Level: Discussed  Confirmed Follow up Appointment: Patient to Call and Schedule Appointment  Medication Reconciliation Updated: Yes  Influenza Vaccine Indication: Not indicated: Previously immunized this influenza season and > 8 years of age    I understand that a diet low in cholesterol, fat, and sodium is recommended for good health. Unless I have been given specific instructions below for another diet, I accept this instruction as my diet prescription.   Other diet: regular    Special Instructions: No aspirin for 2 weeks. Follow up with PCP.    · Is patient discharged on Warfarin / Coumadin?   No     Depression / Suicide Risk    As you are discharged from this [facility] facility, it is important to learn how to keep safe from harming yourself.    Recognize the warning signs:  · Abrupt changes in personality, positive or negative- including increase in energy   · Giving away possessions  · Change in eating patterns- significant weight changes-  positive or negative  · Change in sleeping patterns- unable to sleep or sleeping all the time   · Unwillingness or inability to communicate  · Depression  · Unusual sadness, discouragement and loneliness  · Talk of wanting to die  · Neglect of personal appearance   · Rebelliousness- reckless behavior  · Withdrawal from people/activities they love  · Confusion- inability to concentrate     If you or a loved one observes any of these behaviors or has concerns about self-harm, here's what you can do:  · Talk about it- your feelings and reasons for harming yourself  · Remove any means that you might use to hurt yourself (examples: pills, rope, extension cords,  firearm)  · Get professional help from the community (Mental Health, Substance Abuse, psychological counseling)  · Do not be alone:Call your Safe Contact- someone whom you trust who will be there for you.  · Call your local CRISIS HOTLINE 163-5140 or 523-056-4036  · Call your local Children's Mobile Crisis Response Team [place] (947) 719-6663 or www.Sentons  · Call the toll free National Suicide Prevention Hotlines   · National Suicide Prevention Lifeline 852-573-ZANM (7541)  · National Hope Line Network 800-SUICIDE (413-3110)

## 2018-03-23 NOTE — PROGRESS NOTES
Trauma/Surgical Progress Note    Author: Aisha Mitchell Date & Time created: 3/23/2018   11:05 AM     Interval Events:  Cleared for discharge.  Avoid asa products X 2 weeks  Follow up with PCP  No additional findings noted on Tertiary exam.     Questions answered.     Review of Systems   Constitutional: Negative for chills and fever.   Respiratory: Negative for shortness of breath.    Cardiovascular: Negative for chest pain.   Gastrointestinal: Negative for abdominal pain, diarrhea, nausea and vomiting.   Genitourinary: Negative for dysuria.   Musculoskeletal: Positive for falls.   Neurological: Positive for headaches. Negative for dizziness.     Hemodynamics:  Blood pressure 145/75, pulse 69, temperature 36.1 °C (96.9 °F), resp. rate (!) 43, height 1.524 m (5'), weight 63 kg (138 lb 14.2 oz), SpO2 97 %, not currently breastfeeding.     Respiratory:    Respiration: (!) 43, Pulse Oximetry: 97 %           Fluids:    Intake/Output Summary (Last 24 hours) at 03/23/18 1105  Last data filed at 03/23/18 1000   Gross per 24 hour   Intake              800 ml   Output              850 ml   Net              -50 ml     Admit Weight: 61.7 kg (136 lb)  Current Weight: 63 kg (138 lb 14.2 oz)    Physical Exam   Constitutional: She is oriented to person, place, and time. She appears well-developed and well-nourished. No distress.   HENT:   Head: Head is with contusion (left side of face. ).   Cardiovascular: Normal rate and regular rhythm.    Pulmonary/Chest: Effort normal and breath sounds normal.   Musculoskeletal: Normal range of motion. She exhibits no edema or tenderness.   Neurological: She is alert and oriented to person, place, and time. No cranial nerve deficit. Coordination normal.   Skin: Skin is warm and dry. She is not diaphoretic.   Psychiatric: She has a normal mood and affect. Her behavior is normal. Thought content normal.   Nursing note and vitals reviewed.      Medical Decision Making/Problem List:    Active  Hospital Problems    Diagnosis   • SAH (subarachnoid hemorrhage) (CMS-HCC) [I60.9]     Priority: High     Redd: Small left cerebral subarachnoid hemorrhage  Follow up CT on arrival: No change.  Keppra x7 days  3/23 hold ASA X 2 weeks.   Non-operative management.  Zack Oliveira III, MD. Neurosurgery.       • Facial contusion [S00.83XA]     Priority: Medium     Left side with no facial fractures noted from pre-hospital CT.  Supportive treatment.     • Accelerated hypertension [I10]     Priority: Low     PRN IV anti-hypertensives ordered     • History of hypothyroidism [Z86.39]     Priority: Low     Restart home levothyroxine when dose verified       Core Measures & Quality Metrics:  Medications reviewed, Radiology images reviewed and Labs reviewed  Triplett catheter: No Triplett      DVT Prophylaxis: Contraindicated - High bleeding risk  DVT prophylaxis - mechanical: SCDs  Ulcer prophylaxis: Yes        Total Score: 7  ETOH Screening  CAGE Score: 0  Intervention complete date: 3/23/2018  Patient response to intervention: denies alcohol or tobacco products. .       Discussed patient condition with RN, Patient and general surgery. Dr. Aguilar, Trauma Surgery      Patient seen, data reviewed and discussed.  Agree with assessment and plan.   New admit - small intracranial hemorrhage  Doing well this morning, denies pain or nausea. GCS 15.  Cleared for discharge per neurosurgery.    Critical care time: 37 minutes.    Hill Aguilar MD  [phone]

## 2018-03-23 NOTE — PROGRESS NOTES
"Pt wondering where her \"ride\" son may be. Called son and he stated that he is en route. Pt given lunch.  "

## 2018-03-23 NOTE — THERAPY
"Occupational Therapy Evaluation completed.   Functional Status:  Supervised/Independent with ADLs and txfs  Plan of Care: Patient with no further skilled OT needs in the acute care setting at this time  Discharge Recommendations:  Post-acute therapy Currently anticipate no further skilled therapy needs once patient is discharged from the inpatient setting.    See \"Rehab Therapy-Acute\" Patient Summary Report for complete documentation.    "

## 2018-03-23 NOTE — THERAPY
"Physical Therapy Evaluation completed.   Bed Mobility: Independent    Transfers: Sit to Stand: Independent  Gait: Level Of Assist: Supervised with No Equipment Needed       Plan of Care: Patient with no further skilled PT needs in the acute care setting at this time  Discharge Recommendations: Equipment: No Equipment Needed. Post-acute therapy Currently anticipate no further skilled therapy needs once patient is discharged from the inpatient setting.    See \"Rehab Therapy-Acute\" Patient Summary Report for complete documentation.     "

## 2018-03-23 NOTE — CARE PLAN
Problem: Safety  Goal: Will remain free from injury    Intervention: Educate patient and significant other/support system about adaptive mobility strategies and safe transfers  Pt uses call light appropriately and able to transfer with one person stand by assist.  Bed alarm in use.      Problem: Pain Management  Goal: Pain level will decrease to patient's comfort goal    Intervention: Educate and implement non-pharmacologic comfort measures. Examples: relaxation, distration, play therapy, activity therapy, massage, etc.  Pt c/o pain on the left side of face due to trauma, gets worse with eating.  Tylenol given with relief, pt does not want escalated pain medication therapy.

## 2018-04-12 LAB — EKG IMPRESSION: NORMAL

## 2023-08-14 NOTE — CARE PLAN
Problem: Bowel/Gastric:  Goal: Normal bowel function is maintained or improved  Outcome: PROGRESSING AS EXPECTED  Last bm pta. Will monitor hydration/fluid status. Oral intake adequate.       Script sent.